# Patient Record
(demographics unavailable — no encounter records)

---

## 2024-10-11 NOTE — HISTORY OF PRESENT ILLNESS
[10] : 10 [Full time] : Work status: full time [de-identified] : 45yo RHD female presenting with LT shoulder pain. No known injury/fall. Experiencing pain or 6 months, worsening sharp shooting pain since yesterday. Wearing sling from home. Has tried Ibuprofen and ice/heat with no relief. No past treatment.  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

## 2024-10-11 NOTE — IMAGING
[Left] : left shoulder [FreeTextEntry1] : 2V: The GH is OK.  There are minor AC changes.  There is a lateral calcium. [FreeTextEntry5] : 2V: There is a Type I acromion with low slope.

## 2024-10-11 NOTE — CONSULT LETTER
[Dear  ___] : Dear  [unfilled], [Consult Letter:] : I had the pleasure of evaluating your patient, [unfilled]. [FreeTextEntry1] : Thank you for referring your patient for consultation.  Please see my note below.   If you have any questions, please do not hesitate to contact me.   Sincerely,   Rodolfo Alvarenga M.D. Shoulder Surgery

## 2024-10-11 NOTE — REASON FOR VISIT
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  No prior history or treatment.  Reaching and lifting are affected.  It has woken her up at night.  No numbness.  She is wearing a sling.

## 2024-10-11 NOTE — ASSESSMENT
[FreeTextEntry1] : We reviewed the findings and the history. Questions were answered and concerns addressed. The options were outlined. A MDP is planned. Follow up in 1 week with repeat XRs. An injection and PT are considerations.  Patient seen by Dr. Rodolfo Alvarenga, who determined the assessment and plan. Sandra Pedraza PAC participated in the care of the patient, including the history and physical exam. I, Johana Moulton, am scribing for Dr. Rodolfo Alvarenga in his presence for the chief complaint, physical exam, studies, assessment, and/or plan.

## 2024-10-11 NOTE — PHYSICAL EXAM
[Left] : left shoulder [Right] : right shoulder [] : no sensory deficits [FreeTextEntry9] : Range of motion was not assessed. [de-identified] : Strength was not assessed. [FreeTextEntry3] : There is good motion without pain.

## 2024-10-25 NOTE — REASON FOR VISIT
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  No prior history.  It has woken her up at night.  No numbness.  The MDP has helped, some.  There is "pinching."  She is out of the sling.

## 2024-10-25 NOTE — PHYSICAL EXAM
[Left] : left shoulder [Right] : right shoulder [Sitting] : sitting [Mild] : mild [] : no sensory deficits [de-identified] : Strength was not assessed. [FreeTextEntry3] : There is good motion without pain. [TWNoteComboBox4] : passive forward flexion 135 degrees [de-identified] : external rotation 70 degrees

## 2024-10-25 NOTE — ASSESSMENT
[FreeTextEntry1] : Treatment options reviewed. PT is planned. Naprosyn 500 is prescribed. An injection is a consideration. Questions addressed. Follow up prn.  Patient seen by Dr. Rodolfo Alvarenga, who determined the assessment and plan. Sandra PAVON participated in the care of the patient, including the history and physical exam. IJohana, am scribing for Dr. Rodolfo Alvarenga in his presence for the chief complaint, physical exam, studies, assessment, and/or plan.

## 2024-10-25 NOTE — HISTORY OF PRESENT ILLNESS
[10] : 10 [Full time] : Work status: full time [de-identified] : 43yo RHD female presenting with LT shoulder pain. No known injury/fall. Experiencing pain or 6 months, worsening sharp shooting pain since yesterday. Wearing sling from home. Has tried Ibuprofen and ice/heat with no relief. No past treatment.  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

## 2024-11-08 NOTE — HISTORY OF PRESENT ILLNESS
[FreeTextEntry1] : f/u anxiety [de-identified] : consultant notes reviewed meds reviewed and renewed

## 2025-03-21 NOTE — HISTORY OF PRESENT ILLNESS
[FreeTextEntry1] : 45 y/o F diagnosed with thyroid nodules on exam by PCP in 2004. Pt. remembers having sensation of tightness in the neck. Referred for thyroid US right nodule measuring 3.1 x 1.6 x 1.9 cm and subcentimeter left nodule. 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 Due to patients concerns about cancer with her father passing away from lung cancer she decided to have a right hemithyroidectomy 4/25/2005 by Dr. March.  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 Completion thyroidectomy performed 5/2/2005 with path with adenomatous changes. Treated with 30 mCi of KLINE 6/2005 then started on Synthroid 112 mcg increased over the years increased to 150 mcg daily.  TFTs 1/2024 with iatrogenic hyperthyroidism. Dose lowered to 125 mcg daily. Dose decreased further to 112 mcg 4/2024 for suppressed TSH. Needs brand Synthroid due to the necessity for tight control of TFTs with hx of thyroid cancer and fluctuations on generic in the past. Takes daily in the morning on empty stomach.  Has noticed less fatigue and hair loss. Now has noticed some palpitations, night sweats, and anxiety. Last ocular migraine 12/2024. She attributes to recent breast biopsy with precancer, 6 month follow-up normal. No tremors, cold intolerance. +weight gain despite running and exercising. Appetite normal. No diarrhea or constipation.  No dysphagia or dysphonia. No neck pain. Hx of [employer] exposure. No radiation to the head or neck. No amiodarone or lithium use. No immunotherapy. No thyroid conditions in the family. Reports increase in TG level a few years ago warranting whole body scan with Dr. Hernandez without evidence of KLINE avid tissue.    No recent Thyroid US or TG level.

## 2025-03-21 NOTE — PHYSICAL EXAM
[Alert] : alert [Well Nourished] : well nourished [Healthy Appearance] : healthy appearance [No Acute Distress] : no acute distress [Well Developed] : well developed [EOMI] : extra ocular movement intact [No Proptosis] : no proptosis [Normal Hearing] : hearing was normal [Supple] : the neck was supple [Well Healed Scar] : well healed scar [No Respiratory Distress] : no respiratory distress [No Accessory Muscle Use] : no accessory muscle use [Normal Rate and Effort] : normal respiratory rate and effort [Clear to Auscultation] : lungs were clear to auscultation bilaterally [Normal S1, S2] : normal S1 and S2 [Normal Rate] : heart rate was normal [Regular Rhythm] : with a regular rhythm [No Edema] : no peripheral edema [Not Tender] : non-tender [Not Distended] : not distended [Soft] : abdomen soft [No Stigmata of Cushings Syndrome] : no stigmata of Cushings Syndrome [No Clubbing, Cyanosis] : no clubbing  or cyanosis of the fingernails [No Involuntary Movements] : no involuntary movements were seen [Normal Strength/Tone] : muscle strength and tone were normal [Acanthosis Nigricans] : no acanthosis nigricans [No Motor Deficits] : the motor exam was normal [No Tremors] : no tremors [Oriented x3] : oriented to person, place, and time [Normal Affect] : the affect was normal [Normal Mood] : the mood was normal

## 2025-03-21 NOTE — DATA REVIEWED
[FreeTextEntry1] : Labs 4/2024: A1c 5.5% TSH <0.01 Free T4 1.6 T3 146 TG <0.2  Chol 215 HDL 57  CMP normal  Labs 1/2024: TSH <0.01 Free T4 1.9  Completion thyroidectomy performed 5/2/2005 with path with adenomatous changes.  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  Thyroid US 2004 right nodule measuring 3.1 x 1.6 x 1.9 cm and subcentimeter left nodule.

## 2025-03-21 NOTE — ASSESSMENT
[FreeTextEntry1] : 43 y/o F w/ thyroid nodules with FNA in 2004 consistent with adenomatous goiter, self-promoted right hemithyroidectomy due to concerns about cancer risk as father passed away from metastatic lung cancer with pathology consistent with follicular variant papillary thyroid cancer s/p completion thyroidectomy without evidence of cancer on the contralateral lobe. s/p KLINE 30 mCi in 2005 with post-op hypothyroidism previously on stable dose of levothyroxine 150 mcg daily with adherence. Developed iatrogenic hyperthyroidism 1/2024 and again 4/2024 now on 112 mcg daily with adherence with symptoms such as difficulty losing weight, palpitations, and ocular migraines. Needs brand Synthroid due to the necessity for tight control of TFTs with hx of thyroid cancer and fluctuations on generic in the past. Clinically euthyroid on exam. -Repeat TFTs today- goal around 1.0. No need for full TSH suppression given surgery and treatment now around 20 years ago. -Trend TG level. If any increase would recommend PET/CT given hx of whole body scan without iodine avid tissue. -Check Thyroid US in the meantime. Referral given today.   Prep time with review of labs and reports Discussion with patient regarding thyroid cancer management plan, treatment options and goals of care answering all patients questions and addressing all concerns  Post-visit completion charting, consultation, and review Total Time 30 min  Specifically causes, evaluation, treatment options, risks, complications, and benefits of available therapies were discussed. Questions were answered.  The submitted E/M billing level for this visit reflects the total time spent on the day of the visit including face-to-face time spent with the patient, non-face-to-face review of medical records and relevant information, documentation, and asynchronous communication with the patient after a visit via phone, email, or patient's EHR portal after the visit.  The medical records reviewed are either scanned into the chart or reviewed with the patient using a patient's electronic medical records portal for patients with records not available to [facility] via electronic transmission platforms from other institutions and labs.  Time spend counseling and performing coordination of care was also included in determining the appropriate EM billing level.  I have reviewed and verified information regarding the chief complaint and history recorded by the ancillary staff and/or the patient. I have independently reviewed and interpreted tests performed by other physicians and facilities as necessary.   I have discussed with the patient differential diagnosis, reason for auxiliary tests if ordered, risks, benefits, alternatives, and complications of each form of therapy were discussed.

## 2025-03-21 NOTE — REVIEW OF SYSTEMS
[Fatigue] : fatigue [Recent Weight Gain (___ Lbs)] : recent weight gain: [unfilled] lbs [Visual Field Defect] : no visual field defect [Dysphagia] : no dysphagia [Neck Pain] : no neck pain [Dysphonia] : no dysphonia [Chest Pain] : no chest pain [Palpitations] : palpitations [Shortness Of Breath] : no shortness of breath [Nausea] : no nausea [Constipation] : no constipation [Vomiting] : no vomiting [Diarrhea] : no diarrhea [Polyuria] : no polyuria [Irregular Menses] : regular menses [Joint Pain] : no joint pain [Acanthosis] : no acanthosis  [Headaches] : headaches [Tremors] : no tremors [Polydipsia] : no polydipsia [Cold Intolerance] : no cold intolerance [Heat Intolerance] : no heat intolerance [All other systems negative] : All other systems negative [de-identified] : +ocular migraines

## 2025-06-26 NOTE — HISTORY OF PRESENT ILLNESS
[FreeTextEntry1] : F/U on chronic conditions [de-identified] : -PHIL: Stable, however, notes adverse effect of low libido related to its use Also notes having attention deficit - suspects she may have ADHD, has not formally been evaluated yet